# Patient Record
Sex: FEMALE | Race: WHITE | Employment: FULL TIME | URBAN - METROPOLITAN AREA
[De-identification: names, ages, dates, MRNs, and addresses within clinical notes are randomized per-mention and may not be internally consistent; named-entity substitution may affect disease eponyms.]

---

## 2021-08-03 ENCOUNTER — OFFICE VISIT (OUTPATIENT)
Dept: URGENT CARE | Facility: URGENT CARE | Age: 31
End: 2021-08-03
Payer: COMMERCIAL

## 2021-08-03 VITALS
DIASTOLIC BLOOD PRESSURE: 82 MMHG | HEART RATE: 86 BPM | TEMPERATURE: 97.8 F | WEIGHT: 108 LBS | OXYGEN SATURATION: 94 % | SYSTOLIC BLOOD PRESSURE: 123 MMHG

## 2021-08-03 DIAGNOSIS — K12.0 CANKER SORE: Primary | ICD-10-CM

## 2021-08-03 PROCEDURE — 99203 OFFICE O/P NEW LOW 30 MIN: CPT | Performed by: NURSE PRACTITIONER

## 2021-08-03 RX ORDER — DROSPIRENONE AND ETHINYL ESTRADIOL 0.02-3(28)
1 KIT ORAL DAILY
COMMUNITY

## 2021-08-04 NOTE — PATIENT INSTRUCTIONS
Patient Education     Canker Sore    A canker sore is a painful sore on the lining of the mouth. It s also called an aphthous ulcer. It is most painful during the first few days, and it lasts about 7 to 14 days before going away.   Causes  Canker sores are not cold sores or fever blisters. They are not contagious, so they are not spread by contact. The exact cause of canker sores is not clear, but there are various things that can trigger them in different people.     Mild injury, such as biting the inside of the mouth, lip, or cheek, or dental procedures    Stress    Poor diet, or lack of certain nutrients, including B vitamins and iron    Foods that can irritate the mouth, including tomatoes, citrus fruits, and some nuts (foods that are acidic or contain bitter substances called tannins)    Irritating chemicals, such as those in some toothpastes and mouthwashes    Some chronic illnesses  Symptoms  Canker sores occur on the lining of the mouth. They can be inside the cheeks or lips, on the roof of the mouth, at the base of the gums, on the tongue, or in the back of the throat. Canker sores:     Are small and flat (not raised)    Can be white or yellowish bumps that are red around the edges or have a red halo    Are usually small in size, roundish, and in groups    Cause pain or burning  Canker sores don't leave a scar. But they usually come back.  Home care  The goals of canker sore treatment are to decrease the pain, speed healing, and prevent sores from coming back. No single treatment works for everyone. Try different methods to see what works best.   General care    You may find that soft, easy-to-chew foods cause less pain.    Drink with a straw to direct liquids away from the sore.    Use a soft-bristle toothbrush, and brush your teeth gently.    Don't eat acidic, salty, or spicy foods. These may irritate the canker sores.    Don't eat rough or crunchy foods such as crusty bread or potato chips. These kinds  of foods can hurt the inside of your mouth or scrape your canker sores.  Medicines  You can try over-the-counter medicines that cover the sores and numb them. This protects the sores while they heal and helps reduce pain.   Homemade rinses and solutions  You can use these solutions as mouth rinses. Spit them out after using them. You can also dab them on the sores. You can repeat these treatments as often as needed.     Rinse your mouth with 1/2 teaspoon of salt in 1 glass of warm water.    Mix equal amounts of hydrogen peroxide and water. You can use this as a mouthwash or dab it on sores with a cotton swab. You can also add sodium bicarbonate to this to make a paste, and then dab it on sores.  Follow-up care  Follow up with your healthcare provider, or as advised. If a culture was done, you can call as directed for the results. You will be told if your treatment needs to be changed.   Call 911  Call 911 if you have any of these:     Trouble breathing    Inability to swallow    Extreme drowsiness or trouble waking up    Fainting or loss of consciousness    Fast heart rate    Seizure    Stiff neck  When to seek medical advice  Call your healthcare provider right away if any of these occur:    You have a fever of 100.4 F (38 C) or higher, or as directed by your provider    You are pregnant    You just had surgery or another medical procedure, or you were just discharged from the hospital    It's too painful to eat or swallow    The sore does not go away within 2 weeks  [employer] last reviewed this educational content on 8/1/2020 2000-2021 [employer]. All rights reserved. This information is not intended as a substitute for professional medical care. Always follow your healthcare professional's instructions.

## 2021-08-04 NOTE — PROGRESS NOTES
Chief Complaint   Patient presents with     Urgent Care     Mouth/Lip Problem     x 4 days canker sore in back of mouth         ICD-10-CM    1. Canker sore  K12.0      Salt water gargles,, topical benzocaine, Tylenol as needed for the pain.    Subjective     Susy Mack is an 31 year old female who presents to clinic today for canker sore along the right side of tongue.  She was wondering if she may have strep although she does not have a sore throat swollen glands fever, headaches, or body aches.  She is getting  next weekend and wants to be healthy for her wedding.    ROS: 10 point ROS neg other than the symptoms noted above in the HPI.       Objective    /82 (BP Location: Right arm, Patient Position: Sitting, Cuff Size: Adult Regular)   Pulse 86   Temp 97.8  F (36.6  C) (Tympanic)   Wt 49 kg (108 lb)   SpO2 94%   Breastfeeding No     Physical Exam       GENERAL APPEARANCE: healthy appearing, alert     EYES: PERRL, EOMI, sclera non-icteric     HENT: ear canals and TM's normal and pharynx appears normal no exudate or enlarged tonsils, 6 mm diameter canker sore along the right side of the tongue.     NECK: no adenopathy or asymmetry, thyroid normal to palpation    Patient Instructions     Patient Education     Canker Sore    A canker sore is a painful sore on the lining of the mouth. It s also called an aphthous ulcer. It is most painful during the first few days, and it lasts about 7 to 14 days before going away.   Causes  Canker sores are not cold sores or fever blisters. They are not contagious, so they are not spread by contact. The exact cause of canker sores is not clear, but there are various things that can trigger them in different people.     Mild injury, such as biting the inside of the mouth, lip, or cheek, or dental procedures    Stress    Poor diet, or lack of certain nutrients, including B vitamins and iron    Foods that can irritate the mouth, including tomatoes, citrus  fruits, and some nuts (foods that are acidic or contain bitter substances called tannins)    Irritating chemicals, such as those in some toothpastes and mouthwashes    Some chronic illnesses  Symptoms  Canker sores occur on the lining of the mouth. They can be inside the cheeks or lips, on the roof of the mouth, at the base of the gums, on the tongue, or in the back of the throat. Canker sores:     Are small and flat (not raised)    Can be white or yellowish bumps that are red around the edges or have a red halo    Are usually small in size, roundish, and in groups    Cause pain or burning  Canker sores don't leave a scar. But they usually come back.  Home care  The goals of canker sore treatment are to decrease the pain, speed healing, and prevent sores from coming back. No single treatment works for everyone. Try different methods to see what works best.   General care    You may find that soft, easy-to-chew foods cause less pain.    Drink with a straw to direct liquids away from the sore.    Use a soft-bristle toothbrush, and brush your teeth gently.    Don't eat acidic, salty, or spicy foods. These may irritate the canker sores.    Don't eat rough or crunchy foods such as crusty bread or potato chips. These kinds of foods can hurt the inside of your mouth or scrape your canker sores.  Medicines  You can try over-the-counter medicines that cover the sores and numb them. This protects the sores while they heal and helps reduce pain.   Homemade rinses and solutions  You can use these solutions as mouth rinses. Spit them out after using them. You can also dab them on the sores. You can repeat these treatments as often as needed.     Rinse your mouth with 1/2 teaspoon of salt in 1 glass of warm water.    Mix equal amounts of hydrogen peroxide and water. You can use this as a mouthwash or dab it on sores with a cotton swab. You can also add sodium bicarbonate to this to make a paste, and then dab it on  sores.  Follow-up care  Follow up with your healthcare provider, or as advised. If a culture was done, you can call as directed for the results. You will be told if your treatment needs to be changed.   Call 911  Call 911 if you have any of these:     Trouble breathing    Inability to swallow    Extreme drowsiness or trouble waking up    Fainting or loss of consciousness    Fast heart rate    Seizure    Stiff neck  When to seek medical advice  Call your healthcare provider right away if any of these occur:    You have a fever of 100.4 F (38 C) or higher, or as directed by your provider    You are pregnant    You just had surgery or another medical procedure, or you were just discharged from the hospital    It's too painful to eat or swallow    The sore does not go away within 2 weeks  [employer] last reviewed this educational content on 8/1/2020 2000-2021 [employer]. All rights reserved. This information is not intended as a substitute for professional medical care. Always follow your healthcare professional's instructions.               NIKIA Sawyer, CNP  [facility] Provider